# Patient Record
Sex: FEMALE | Employment: STUDENT | URBAN - METROPOLITAN AREA
[De-identification: names, ages, dates, MRNs, and addresses within clinical notes are randomized per-mention and may not be internally consistent; named-entity substitution may affect disease eponyms.]

---

## 2022-09-17 ENCOUNTER — HOSPITAL ENCOUNTER (EMERGENCY)
Facility: CLINIC | Age: 22
Discharge: HOME | End: 2022-09-17
Attending: EMERGENCY MEDICINE | Admitting: EMERGENCY MEDICINE
Payer: COMMERCIAL

## 2022-09-17 VITALS
HEART RATE: 74 BPM | WEIGHT: 140 LBS | OXYGEN SATURATION: 98 % | BODY MASS INDEX: 21.97 KG/M2 | TEMPERATURE: 98 F | RESPIRATION RATE: 16 BRPM | DIASTOLIC BLOOD PRESSURE: 73 MMHG | HEIGHT: 67 IN | SYSTOLIC BLOOD PRESSURE: 121 MMHG

## 2022-09-17 DIAGNOSIS — S01.512A LACERATION OF TONGUE, INITIAL ENCOUNTER: ICD-10-CM

## 2022-09-17 PROCEDURE — 99283 EMERGENCY DEPT VISIT LOW MDM: CPT | Performed by: EMERGENCY MEDICINE

## 2022-09-17 PROCEDURE — 250N000009 HC RX 250: Performed by: EMERGENCY MEDICINE

## 2022-09-17 RX ORDER — LIDOCAINE HYDROCHLORIDE 40 MG/ML
50 SOLUTION TOPICAL
Status: COMPLETED | OUTPATIENT
Start: 2022-09-17 | End: 2022-09-17

## 2022-09-17 RX ORDER — SERTRALINE HYDROCHLORIDE 100 MG/1
100 TABLET, FILM COATED ORAL DAILY
COMMUNITY

## 2022-09-17 RX ADMIN — LIDOCAINE HYDROCHLORIDE 50 ML: 40 SOLUTION TOPICAL at 02:47

## 2022-09-17 ASSESSMENT — ACTIVITIES OF DAILY LIVING (ADL): ADLS_ACUITY_SCORE: 35

## 2022-09-17 NOTE — DISCHARGE INSTRUCTIONS
Swish and swallow with salt water for the next few days    Return to the emergency department if you develop ongoing bleeding that does not resolve after compression.

## 2022-09-17 NOTE — ED PROVIDER NOTES
"ED Provider Note  [facility]      History     Chief Complaint   Patient presents with     Mouth Injury     Pt was trying to \"bite a straw\" and her tongue \"went sideways\" and pt bit her own tongue. States it has been bleeding for 3 hours.     HPI  Karoline Avalos is a 22 year old female who is presenting with tongue bleed.  She bit the tip of her tongue after biting down on a straw.  She is been drinking alcohol.  No chest pain, shortness of breath, dizziness.  Denies other bleeding.  She is not on blood thinners.  She has not had issues with bleeding or clotting in the past.  It has been bleeding for about 3 hours.  She has been compressing it without relief.  No trouble swallowing or breathing.    Past Medical History  Past Medical History:   Diagnosis Date     Celiac disease      History reviewed. No pertinent surgical history.  sertraline (ZOLOFT) 100 MG tablet      Allergies   Allergen Reactions     Gluten Meal      Family History  History reviewed. No pertinent family history.  Social History   Social History     Tobacco Use     Smoking status: Never Smoker     Smokeless tobacco: Never Used   Substance Use Topics     Alcohol use: Yes     Comment: occasional binge     Drug use: Not Currently      Past medical history, past surgical history, medications, allergies, family history, and social history were reviewed with the patient. No additional pertinent items.       Review of Systems  A complete review of systems was performed with pertinent positives and negatives noted in the HPI, and all other systems negative.    Physical Exam   BP: 125/79  Pulse: 90  Temp: 97.9  F (36.6  C)  Resp: 18  Height: 170.2 cm (5' 7\")  Weight: 63.5 kg (140 lb)  SpO2: 95 %  Physical Exam  Physical Exam   Constitutional: oriented to person, place, and time. appears well-developed and well-nourished.   HENT:   Head: Normocephalic and atraumatic. Small 0.5 cm laceration to the left anterior tongue that is " oozing blood, not through and through.  No other intraoral bleeding or trauma noted.  Neck: Normal range of motion.   Pulmonary/Chest: Effort normal. No respiratory distress.   Cardiac: No murmurs, rubs, gallops. RRR.  Abdominal: Abdomen soft, nontender, nondistended. No rebound tenderness.  MSK: Long bones without deformity or evidence of trauma  Neurological: alert and oriented to person, place, and time.   Skin: Skin is warm and dry.   Psychiatric:  normal mood and affect.  behavior is normal. Thought content normal.     ED Course      Procedures              No results found for any visits on 09/17/22.  Medications   lidocaine (XYLOCAINE) 4 % solution 50 mL (has no administration in time range)        Assessments & Plan (with Medical Decision Making)   MDM  Patient presenting with tongue laceration.  It is not through and through.  It is not gaping.  Bleeding stopped with some compression with lidocaine soaked gauze.  She has no symptoms of anemia and vitals are stable.  She has no history of bleeding issues.  Discussed options.  The patient elected to watch it.  Discussed putting a suture in it however is concerned about causing ongoing bleeding.  Patient was instructed in how to provide pressure at home.  As the laceration is more of a punctate wound that her not feel that she needs suture repair.  No need for antibiotics at this point.  She will return if she has any further concerns.    I have reviewed the nursing notes. I have reviewed the findings, diagnosis, plan and need for follow up with the patient.    New Prescriptions    No medications on file       Final diagnoses:   Laceration of tongue, initial encounter       --  Jef Aponte  [facility] EMERGENCY DEPARTMENT  9/17/2022     Jef Aponte MD  09/17/22 0351

## 2022-09-17 NOTE — ED TRIAGE NOTES
"Pt was trying to \"bite a straw\" and her tongue \"went sideways\" and pt bit her own tongue. States it has been bleeding for 3 hours.     Triage Assessment     Row Name 09/17/22 0133       Triage Assessment (Adult)    Airway WDL WDL       Respiratory WDL    Respiratory WDL WDL       Skin Circulation/Temperature WDL    Skin Circulation/Temperature WDL WDL       Cardiac WDL    Cardiac WDL WDL       Peripheral/Neurovascular WDL    Peripheral Neurovascular WDL WDL       Cognitive/Neuro/Behavioral WDL    Cognitive/Neuro/Behavioral WDL WDL              "

## 2025-02-05 ENCOUNTER — ANCILLARY PROCEDURE (OUTPATIENT)
Dept: MAMMOGRAPHY | Facility: CLINIC | Age: 25
End: 2025-02-05
Payer: COMMERCIAL

## 2025-02-05 DIAGNOSIS — R22.30 AXILLARY LUMP: ICD-10-CM

## 2025-02-05 PROCEDURE — 76882 US LMTD JT/FCL EVL NVASC XTR: CPT | Mod: RT | Performed by: STUDENT IN AN ORGANIZED HEALTH CARE EDUCATION/TRAINING PROGRAM
